# Patient Record
(demographics unavailable — no encounter records)

---

## 2025-04-28 NOTE — HISTORY OF PRESENT ILLNESS
[FreeTextEntry1] : 68 year male with past medical history significant for presenting after vascular was consulted when patient presented to  for TIA wiht complaints of numbness and tingling. CT head/neck reviewed while patient was admitted which demonstrated "Fibromuscular dysplasia of both carotid arteries." Patient presents with his daughter who states that this is his third TIA. Patient denies CVA with any residual deficits. He denies any changes in vision, speech or unilateral weakness.

## 2025-04-28 NOTE — ASSESSMENT
[FreeTextEntry1] : 68 year male presenting for follow up after vascular was consulted at  Fibromuscular dysplasia of both carotid arteries as reported on CT head/neck at  after he was admitted for possible TIA. I discussed CT results with both patient and daughter unfortunately there is alot of artifact on the imaging. Bilateral carotid duplex reviewed in office today with no abnormal appearance and <50 % bilaterally. At this time there is no need for intervention the workup does not support Fibromuscular dysplasia of the carotid arteries. Encouraged following up with PCP and neurology after patient's recent TIA event.

## 2025-04-28 NOTE — PHYSICAL EXAM
[Alert] : alert [Oriented to Person] : oriented to person [Oriented to Place] : oriented to place [Oriented to Time] : oriented to time [2+] : left 2+ [de-identified] : ambulating without assistance  [de-identified] : cranial nerves intact  [FreeTextEntry1] : B/L LE warm, well perfused no wounds

## 2025-04-28 NOTE — PHYSICAL EXAM
[Alert] : alert [Oriented to Person] : oriented to person [Oriented to Place] : oriented to place [Oriented to Time] : oriented to time [2+] : left 2+ [de-identified] : ambulating without assistance  [de-identified] : cranial nerves intact  [FreeTextEntry1] : B/L LE warm, well perfused no wounds